# Patient Record
Sex: FEMALE | Race: WHITE | URBAN - METROPOLITAN AREA
[De-identification: names, ages, dates, MRNs, and addresses within clinical notes are randomized per-mention and may not be internally consistent; named-entity substitution may affect disease eponyms.]

---

## 2018-12-13 ENCOUNTER — HOSPITAL ENCOUNTER (OUTPATIENT)
Dept: ULTRASOUND IMAGING | Age: 28
Discharge: HOME | End: 2018-12-13
Payer: COMMERCIAL

## 2018-12-13 ENCOUNTER — HOSPITAL ENCOUNTER (OUTPATIENT)
Dept: WOMENS IMAGING | Age: 28
End: 2018-12-13
Payer: COMMERCIAL

## 2018-12-13 DIAGNOSIS — N63.0 BREAST LUMP: ICD-10-CM

## 2018-12-13 PROCEDURE — 76642 ULTRASOUND BREAST LIMITED: CPT

## 2020-08-14 ENCOUNTER — HOSPITAL ENCOUNTER (INPATIENT)
Age: 30
LOS: 3 days | Discharge: HOME | End: 2020-08-17
Attending: OBSTETRICS & GYNECOLOGY | Admitting: OBSTETRICS & GYNECOLOGY
Payer: COMMERCIAL

## 2020-08-14 PROBLEM — Z37.9 NORMAL LABOR: Status: ACTIVE | Noted: 2020-08-14

## 2020-08-14 LAB
ABO/RH: NORMAL
AMPHETAMINE SCREEN, URINE: NORMAL
ANTIBODY SCREEN: NORMAL
BARBITURATE SCREEN URINE: NORMAL
BENZODIAZEPINE SCREEN, URINE: NORMAL
BUPRENORPHINE URINE: NORMAL
CANNABINOID SCREEN URINE: NORMAL
COCAINE METABOLITE SCREEN URINE: NORMAL
HCT VFR BLD CALC: 35.4 % (ref 36–48)
HEMOGLOBIN: 12.6 G/DL (ref 12–16)
Lab: NORMAL
MCH RBC QN AUTO: 32.3 PG (ref 26–34)
MCHC RBC AUTO-ENTMCNC: 35.7 G/DL (ref 31–36)
MCV RBC AUTO: 90.4 FL (ref 80–100)
METHADONE SCREEN, URINE: NORMAL
OPIATE SCREEN URINE: NORMAL
OXYCODONE URINE: NORMAL
PDW BLD-RTO: 12.3 % (ref 12.4–15.4)
PH UA: 6
PHENCYCLIDINE SCREEN URINE: NORMAL
PLATELET # BLD: 123 K/UL (ref 135–450)
PMV BLD AUTO: 9.1 FL (ref 5–10.5)
PROPOXYPHENE SCREEN: NORMAL
RBC # BLD: 3.92 M/UL (ref 4–5.2)
WBC # BLD: 9.6 K/UL (ref 4–11)

## 2020-08-14 PROCEDURE — U0003 INFECTIOUS AGENT DETECTION BY NUCLEIC ACID (DNA OR RNA); SEVERE ACUTE RESPIRATORY SYNDROME CORONAVIRUS 2 (SARS-COV-2) (CORONAVIRUS DISEASE [COVID-19]), AMPLIFIED PROBE TECHNIQUE, MAKING USE OF HIGH THROUGHPUT TECHNOLOGIES AS DESCRIBED BY CMS-2020-01-R: HCPCS

## 2020-08-14 PROCEDURE — 80307 DRUG TEST PRSMV CHEM ANLYZR: CPT

## 2020-08-14 PROCEDURE — 85027 COMPLETE CBC AUTOMATED: CPT

## 2020-08-14 PROCEDURE — 86780 TREPONEMA PALLIDUM: CPT

## 2020-08-14 PROCEDURE — 86850 RBC ANTIBODY SCREEN: CPT

## 2020-08-14 PROCEDURE — 1220000000 HC SEMI PRIVATE OB R&B

## 2020-08-14 PROCEDURE — 2580000003 HC RX 258: Performed by: OBSTETRICS & GYNECOLOGY

## 2020-08-14 PROCEDURE — 86900 BLOOD TYPING SEROLOGIC ABO: CPT

## 2020-08-14 PROCEDURE — 86901 BLOOD TYPING SEROLOGIC RH(D): CPT

## 2020-08-14 RX ORDER — DOCUSATE SODIUM 100 MG/1
100 CAPSULE, LIQUID FILLED ORAL 2 TIMES DAILY
Status: DISCONTINUED | OUTPATIENT
Start: 2020-08-14 | End: 2020-08-15 | Stop reason: HOSPADM

## 2020-08-14 RX ORDER — SODIUM CHLORIDE, SODIUM LACTATE, POTASSIUM CHLORIDE, CALCIUM CHLORIDE 600; 310; 30; 20 MG/100ML; MG/100ML; MG/100ML; MG/100ML
INJECTION, SOLUTION INTRAVENOUS CONTINUOUS
Status: DISCONTINUED | OUTPATIENT
Start: 2020-08-14 | End: 2020-08-15

## 2020-08-14 RX ORDER — CETIRIZINE HYDROCHLORIDE 10 MG/1
10 TABLET ORAL DAILY
COMMUNITY

## 2020-08-14 RX ORDER — ONDANSETRON 2 MG/ML
4 INJECTION INTRAMUSCULAR; INTRAVENOUS EVERY 6 HOURS PRN
Status: DISCONTINUED | OUTPATIENT
Start: 2020-08-14 | End: 2020-08-15 | Stop reason: HOSPADM

## 2020-08-14 RX ORDER — SODIUM CHLORIDE 0.9 % (FLUSH) 0.9 %
10 SYRINGE (ML) INJECTION EVERY 12 HOURS SCHEDULED
Status: DISCONTINUED | OUTPATIENT
Start: 2020-08-14 | End: 2020-08-15 | Stop reason: HOSPADM

## 2020-08-14 RX ORDER — ACETAMINOPHEN 325 MG/1
650 TABLET ORAL EVERY 4 HOURS PRN
Status: DISCONTINUED | OUTPATIENT
Start: 2020-08-14 | End: 2020-08-15 | Stop reason: HOSPADM

## 2020-08-14 RX ORDER — SODIUM CHLORIDE 0.9 % (FLUSH) 0.9 %
10 SYRINGE (ML) INJECTION PRN
Status: DISCONTINUED | OUTPATIENT
Start: 2020-08-14 | End: 2020-08-15 | Stop reason: HOSPADM

## 2020-08-14 RX ADMIN — SODIUM CHLORIDE, POTASSIUM CHLORIDE, SODIUM LACTATE AND CALCIUM CHLORIDE: 600; 310; 30; 20 INJECTION, SOLUTION INTRAVENOUS at 21:20

## 2020-08-14 ASSESSMENT — PAIN DESCRIPTION - DESCRIPTORS: DESCRIPTORS: CRAMPING

## 2020-08-15 ENCOUNTER — ANESTHESIA (OUTPATIENT)
Dept: LABOR AND DELIVERY | Age: 30
End: 2020-08-15
Payer: COMMERCIAL

## 2020-08-15 ENCOUNTER — ANESTHESIA EVENT (OUTPATIENT)
Dept: LABOR AND DELIVERY | Age: 30
End: 2020-08-15
Payer: COMMERCIAL

## 2020-08-15 LAB
SARS-COV-2, NAA: NOT DETECTED
TOTAL SYPHILLIS IGG/IGM: NORMAL

## 2020-08-15 PROCEDURE — 2580000003 HC RX 258: Performed by: OBSTETRICS & GYNECOLOGY

## 2020-08-15 PROCEDURE — 7200000001 HC VAGINAL DELIVERY

## 2020-08-15 PROCEDURE — 1220000000 HC SEMI PRIVATE OB R&B

## 2020-08-15 PROCEDURE — 51701 INSERT BLADDER CATHETER: CPT

## 2020-08-15 PROCEDURE — 2500000003 HC RX 250 WO HCPCS

## 2020-08-15 PROCEDURE — 0KQM0ZZ REPAIR PERINEUM MUSCLE, OPEN APPROACH: ICD-10-PCS | Performed by: OBSTETRICS & GYNECOLOGY

## 2020-08-15 PROCEDURE — 6360000002 HC RX W HCPCS: Performed by: OBSTETRICS & GYNECOLOGY

## 2020-08-15 PROCEDURE — 3700000025 EPIDURAL BLOCK: Performed by: ANESTHESIOLOGY

## 2020-08-15 PROCEDURE — 59025 FETAL NON-STRESS TEST: CPT

## 2020-08-15 PROCEDURE — 2580000003 HC RX 258: Performed by: NURSE ANESTHETIST, CERTIFIED REGISTERED

## 2020-08-15 PROCEDURE — 2500000003 HC RX 250 WO HCPCS: Performed by: NURSE ANESTHETIST, CERTIFIED REGISTERED

## 2020-08-15 PROCEDURE — 3E033VJ INTRODUCTION OF OTHER HORMONE INTO PERIPHERAL VEIN, PERCUTANEOUS APPROACH: ICD-10-PCS | Performed by: OBSTETRICS & GYNECOLOGY

## 2020-08-15 PROCEDURE — 6370000000 HC RX 637 (ALT 250 FOR IP): Performed by: OBSTETRICS & GYNECOLOGY

## 2020-08-15 RX ORDER — IBUPROFEN 400 MG/1
800 TABLET ORAL EVERY 6 HOURS PRN
Status: DISCONTINUED | OUTPATIENT
Start: 2020-08-15 | End: 2020-08-17 | Stop reason: HOSPADM

## 2020-08-15 RX ORDER — SODIUM CHLORIDE 0.9 % (FLUSH) 0.9 %
10 SYRINGE (ML) INJECTION EVERY 12 HOURS SCHEDULED
Status: DISCONTINUED | OUTPATIENT
Start: 2020-08-15 | End: 2020-08-17 | Stop reason: HOSPADM

## 2020-08-15 RX ORDER — SODIUM CHLORIDE, SODIUM LACTATE, POTASSIUM CHLORIDE, CALCIUM CHLORIDE 600; 310; 30; 20 MG/100ML; MG/100ML; MG/100ML; MG/100ML
INJECTION, SOLUTION INTRAVENOUS CONTINUOUS PRN
Status: DISCONTINUED | OUTPATIENT
Start: 2020-08-15 | End: 2020-08-15 | Stop reason: SDUPTHER

## 2020-08-15 RX ORDER — FERROUS SULFATE TAB EC 324 MG (65 MG FE EQUIVALENT) 324 (65 FE) MG
324 TABLET DELAYED RESPONSE ORAL 2 TIMES DAILY WITH MEALS
Status: DISCONTINUED | OUTPATIENT
Start: 2020-08-15 | End: 2020-08-17 | Stop reason: HOSPADM

## 2020-08-15 RX ORDER — HYDROCODONE BITARTRATE AND ACETAMINOPHEN 5; 325 MG/1; MG/1
1 TABLET ORAL EVERY 4 HOURS PRN
Status: DISCONTINUED | OUTPATIENT
Start: 2020-08-15 | End: 2020-08-17 | Stop reason: HOSPADM

## 2020-08-15 RX ORDER — SODIUM CHLORIDE 0.9 % (FLUSH) 0.9 %
10 SYRINGE (ML) INJECTION PRN
Status: DISCONTINUED | OUTPATIENT
Start: 2020-08-15 | End: 2020-08-17 | Stop reason: HOSPADM

## 2020-08-15 RX ORDER — DOCUSATE SODIUM 100 MG/1
100 CAPSULE, LIQUID FILLED ORAL 2 TIMES DAILY
Status: DISCONTINUED | OUTPATIENT
Start: 2020-08-15 | End: 2020-08-17 | Stop reason: HOSPADM

## 2020-08-15 RX ORDER — LANOLIN 100 %
OINTMENT (GRAM) TOPICAL PRN
Status: DISCONTINUED | OUTPATIENT
Start: 2020-08-15 | End: 2020-08-17 | Stop reason: HOSPADM

## 2020-08-15 RX ORDER — ACETAMINOPHEN 325 MG/1
650 TABLET ORAL EVERY 4 HOURS PRN
Status: DISCONTINUED | OUTPATIENT
Start: 2020-08-15 | End: 2020-08-17 | Stop reason: HOSPADM

## 2020-08-15 RX ORDER — NALBUPHINE HCL 10 MG/ML
5 AMPUL (ML) INJECTION EVERY 4 HOURS PRN
Status: DISCONTINUED | OUTPATIENT
Start: 2020-08-15 | End: 2020-08-15 | Stop reason: HOSPADM

## 2020-08-15 RX ORDER — ONDANSETRON 2 MG/ML
4 INJECTION INTRAMUSCULAR; INTRAVENOUS EVERY 6 HOURS PRN
Status: DISCONTINUED | OUTPATIENT
Start: 2020-08-15 | End: 2020-08-15 | Stop reason: HOSPADM

## 2020-08-15 RX ORDER — DIPHENHYDRAMINE HYDROCHLORIDE 50 MG/ML
25 INJECTION INTRAMUSCULAR; INTRAVENOUS EVERY 6 HOURS PRN
Status: DISCONTINUED | OUTPATIENT
Start: 2020-08-15 | End: 2020-08-15 | Stop reason: HOSPADM

## 2020-08-15 RX ORDER — ONDANSETRON 4 MG/1
8 TABLET, ORALLY DISINTEGRATING ORAL EVERY 8 HOURS PRN
Status: DISCONTINUED | OUTPATIENT
Start: 2020-08-15 | End: 2020-08-17 | Stop reason: HOSPADM

## 2020-08-15 RX ORDER — SODIUM CHLORIDE, SODIUM LACTATE, POTASSIUM CHLORIDE, CALCIUM CHLORIDE 600; 310; 30; 20 MG/100ML; MG/100ML; MG/100ML; MG/100ML
INJECTION, SOLUTION INTRAVENOUS CONTINUOUS
Status: DISCONTINUED | OUTPATIENT
Start: 2020-08-15 | End: 2020-08-17 | Stop reason: HOSPADM

## 2020-08-15 RX ORDER — NALOXONE HYDROCHLORIDE 0.4 MG/ML
0.4 INJECTION, SOLUTION INTRAMUSCULAR; INTRAVENOUS; SUBCUTANEOUS PRN
Status: DISCONTINUED | OUTPATIENT
Start: 2020-08-15 | End: 2020-08-15 | Stop reason: HOSPADM

## 2020-08-15 RX ORDER — HYDROCODONE BITARTRATE AND ACETAMINOPHEN 5; 325 MG/1; MG/1
2 TABLET ORAL EVERY 4 HOURS PRN
Status: DISCONTINUED | OUTPATIENT
Start: 2020-08-15 | End: 2020-08-17 | Stop reason: HOSPADM

## 2020-08-15 RX ADMIN — Medication 10 ML/HR: at 03:22

## 2020-08-15 RX ADMIN — Medication 10 ML: at 03:21

## 2020-08-15 RX ADMIN — HYDROCODONE BITARTRATE AND ACETAMINOPHEN 1 TABLET: 5; 325 TABLET ORAL at 23:59

## 2020-08-15 RX ADMIN — Medication 4 MILLI-UNITS/MIN: at 02:45

## 2020-08-15 RX ADMIN — IBUPROFEN 800 MG: 400 TABLET, FILM COATED ORAL at 15:06

## 2020-08-15 RX ADMIN — IBUPROFEN 800 MG: 400 TABLET, FILM COATED ORAL at 20:48

## 2020-08-15 RX ADMIN — SODIUM CHLORIDE, SODIUM LACTATE, POTASSIUM CHLORIDE, AND CALCIUM CHLORIDE: .6; .31; .03; .02 INJECTION, SOLUTION INTRAVENOUS at 03:11

## 2020-08-15 RX ADMIN — Medication 3 MILLI-UNITS/MIN: at 02:15

## 2020-08-15 RX ADMIN — Medication 6 MILLI-UNITS/MIN: at 04:30

## 2020-08-15 RX ADMIN — Medication 1 MILLI-UNITS/MIN: at 01:15

## 2020-08-15 RX ADMIN — Medication 5 MILLI-UNITS/MIN: at 03:30

## 2020-08-15 RX ADMIN — Medication 2 MILLI-UNITS/MIN: at 01:45

## 2020-08-15 RX ADMIN — SODIUM CHLORIDE, POTASSIUM CHLORIDE, SODIUM LACTATE AND CALCIUM CHLORIDE: 600; 310; 30; 20 INJECTION, SOLUTION INTRAVENOUS at 03:21

## 2020-08-15 RX ADMIN — Medication 10 ML: at 20:38

## 2020-08-15 ASSESSMENT — PAIN DESCRIPTION - DESCRIPTORS
DESCRIPTORS: CRAMPING
DESCRIPTORS: DISCOMFORT
DESCRIPTORS: CRAMPING

## 2020-08-15 ASSESSMENT — PAIN DESCRIPTION - PROGRESSION: CLINICAL_PROGRESSION: GRADUALLY IMPROVING

## 2020-08-15 ASSESSMENT — PAIN DESCRIPTION - FREQUENCY: FREQUENCY: INTERMITTENT

## 2020-08-15 ASSESSMENT — PAIN - FUNCTIONAL ASSESSMENT: PAIN_FUNCTIONAL_ASSESSMENT: ACTIVITIES ARE NOT PREVENTED

## 2020-08-15 ASSESSMENT — PAIN DESCRIPTION - LOCATION: LOCATION: PERINEUM;VAGINA

## 2020-08-15 ASSESSMENT — PAIN SCALES - GENERAL
PAINLEVEL_OUTOF10: 1
PAINLEVEL_OUTOF10: 6
PAINLEVEL_OUTOF10: 5
PAINLEVEL_OUTOF10: 5

## 2020-08-15 ASSESSMENT — PAIN DESCRIPTION - RADICULAR PAIN: RADICULAR_PAIN: ABSENT

## 2020-08-15 ASSESSMENT — PAIN DESCRIPTION - ONSET: ONSET: GRADUAL

## 2020-08-15 ASSESSMENT — PAIN DESCRIPTION - PAIN TYPE: TYPE: ACUTE PAIN

## 2020-08-15 NOTE — ANESTHESIA POSTPROCEDURE EVALUATION
Department of Anesthesiology  Postprocedure Note    Patient: Sid Caballero  MRN: [id number]  YOB: 1990  Date of evaluation: 8/15/2020  Time:  11:41 AM     Procedure Summary     Date:  08/15/20 Room / Location:      Anesthesia Start:  5255 Anesthesia Stop:  1004    Procedure:  Labor Analgesia Diagnosis:      Scheduled Providers:   Responsible Provider:  Osman Richards MD    Anesthesia Type:  epidural ASA Status:  2          Anesthesia Type: No value filed. Rose Mary Phase I: Rose Mary Score: 9    Rose Mary Phase II:      Last vitals: Reviewed and per EMR flowsheets.        Anesthesia Post Evaluation    Patient location during evaluation: bedside  Patient participation: complete - patient participated  Level of consciousness: awake and alert  Pain score: 1  Airway patency: patent  Nausea & Vomiting: no nausea and no vomiting  Complications: no  Cardiovascular status: hemodynamically stable  Respiratory status: room air, spontaneous ventilation and acceptable  Hydration status: stable

## 2020-08-15 NOTE — L&D DELIVERY NOTE
Department of Obstetrics and Gynecology  Spontaneous Vaginal Delivery Note      Azael LESTER,[id number]    PRENATAL CARE: Complicated by: none    Pre-operative Diagnosis:  SROM      Post-operative Diagnosis: The same    Additional Procedures: perineal laceration repair second degree    Information for the patient's :  Mary Ma [[id number]]        7lb 11 oz  APGARS 7/9            Infant Wt:   Information for the patient's :  Mary Vital [[id number]]            APGARS:     Information for the patient's :  Mary Vital [[id number]]           Anesthesia:  epidural anesthesia    Specimen:  Placenta sent to pathology No    Estimated blood loss: 200 cc     Condition: infant stable and mother stable    Infant Feeding: breast    Delivery Summary: Patient is Forest Barrientos is a 30 y.o.  female at 38w4d admitted to Labor and Delivery room and placed on external monitors. Contractions were irregular, FHT was reactive with moderate variability without decels. Patient did received epidural anesthesia. She received pitocin  Labor progressed as anticipated to fully dilated cervix. With subsequent contractions she started pushing x 1 hour. Head delivered with tight nuchal, so patient push through and delivered vaginally spontaneously. 10 Units of Pitocin in 500 ml of LR was started IV. Placenta, cord and membranes were delivered spontaneously within less than 15 minutes. Uterus was not explored. The cervix, vagina and perineum were examined and second degree perineal laceration was repaired in the regular fashion with Vicryl. Uterus was well contracted. Vaginal sweep was done, laps count was correct. Mother and  left stable to recovery.      Electronically signed by Corey Rivas DO on 8/15/2020 at 10:30 AM

## 2020-08-15 NOTE — ANESTHESIA PROCEDURE NOTES
Epidural Block    Patient location during procedure: OB  Start time: 8/15/2020 3:11 AM  End time: 8/15/2020 3:25 AM  Reason for block: labor epidural  Preanesthetic Checklist  Completed: patient identified, site marked, surgical consent, pre-op evaluation, timeout performed, IV checked, risks and benefits discussed, monitors and equipment checked, anesthesia consent given, oxygen available and patient being monitored  Epidural  Patient position: sitting  Prep: Betadine and site prepped and draped  Patient monitoring: continuous pulse ox and frequent blood pressure checks  Approach: midline  Location: lumbar (1-5)  Injection technique: LAY saline  Procedures: paresthesia technique  Provider prep: mask and sterile gloves  Needle  Needle type: Tuohy   Needle gauge: 17 G  Needle length: 3.5 in  Needle insertion depth: 4 cm  Catheter type: side hole  Catheter size: 20G. Catheter at skin depth: 9 cm  Test dose: negative  Assessment  Sensory level: T6  Hemodynamics: stable  Attempts: 1  Additional Notes  Sterile tech. , sitting position, Lidocaine skin wheel, secured with steri-strips, tegaderm dressing, dosed & infusion with Marcaine 0.125% with Fentanyl 3 mcg/ml, infusion rate at 4 ml every 20 minutes.

## 2020-08-15 NOTE — ANESTHESIA PRE PROCEDURE
Department of Anesthesiology  Preprocedure Note       Name:  Shobha Osorio   Age:  30 y.o.  :  1990                                          MRN:  [id number]         Date:  8/15/2020      Surgeon: Dr. Kalpana Jacinto  Procedure: MARIETTA L&D    Medications prior to admission:   Prior to Admission medications    Medication Sig Start Date End Date Taking?  Authorizing Provider   cetirizine (ZYRTEC) 10 MG tablet Take 10 mg by mouth daily   Yes Historical Provider, MD       Current medications:    Current Facility-Administered Medications   Medication Dose Route Frequency Provider Last Rate Last Dose    lactated ringers infusion   Intravenous [facility],  mL/hr at 08/15/20 0321      sodium chloride flush 0.9 % injection 10 mL  10 mL Intravenous 2 times per day [facility], DO        sodium chloride flush 0.9 % injection 10 mL  10 mL Intravenous PRN [facility], DO        acetaminophen (TYLENOL) tablet 650 mg  650 mg Oral Q4H PRN [facility], DO        docusate sodium (COLACE) capsule 100 mg  100 mg Oral BID [facility], DO        ondansetron (ZOFRAN) injection 4 mg  4 mg Intravenous Q6H PRN [facility], DO        benzocaine-menthol (DERMOPLAST) 20-0.5 % spray   Topical PRN [facility], DO        oxytocin (PITOCIN) 30 units in 500 mL infusion  1 rehan-units/min Intravenous Continuous [facility], DO 5 mL/hr at 08/15/20 0330 5 rehan-units/min at 08/15/20 0330    oxytocin (PITOCIN) 30 units in 500 mL infusion  1 rehan-units/min Intravenous Continuous PRN [facility], DO         Facility-Administered Medications Ordered in Other Encounters   Medication Dose Route Frequency Provider Last Rate Last Dose    lactated ringers infusion    Continuous PRN Catherne Se, APRN - CRNA        fentaNYL 3 mcg/ml bupivacaine 0.125% in sodium chloride 0.9% 100 mL epidural    Continuous PRN Catherne Se, APRN - CRNA 10 mL/hr at 08/15/20 0322 10 mL/hr at 08/15/20 0322    fentaNYL 3 mcg/ml bupivacaine 0.125% in sodium chloride 0.9% 100 mL epidural    PRN Winnie Bamberger, APRN - CRNA   10 mL at 08/15/20 0321       Allergies: Allergies   Allergen Reactions    Peanut (Diagnostic)      08/06/2020 - ls 07/29/2020 -         Problem List:    Patient Active Problem List   Diagnosis Code    Normal labor O80, Z37.9       Past Medical History:        Diagnosis Date    Asthma        Past Surgical History:  History reviewed. No pertinent surgical history. Social History:    Social History     Tobacco Use    Smoking status: Never Smoker    Smokeless tobacco: Never Used   Substance Use Topics    Alcohol use: Not Currently                                Counseling given: Not Answered      Vital Signs (Current):   Vitals:    08/14/20 2355 08/15/20 0056 08/15/20 0146 08/15/20 0318   BP: 121/69 138/76 130/62 138/86   Pulse: 80 79 85 90   Resp: 16 16 16 18   Temp: 36.9 °C (98.4 °F) 36.5 °C (97.7 °F) 37 °C (98.6 °F) 36.7 °C (98 °F)   TempSrc: Oral Oral Oral Oral   Weight:       Height:                                                  BP Readings from Last 3 Encounters:   08/15/20 138/86       NPO Status:                                                                                 BMI:   Wt Readings from Last 3 Encounters:   08/14/20 152 lb (68.9 kg)     Body mass index is 26.93 kg/m². CBC:   Lab Results   Component Value Date    WBC 9.6 08/14/2020    RBC 3.92 08/14/2020    HGB 12.6 08/14/2020    HCT 35.4 08/14/2020    MCV 90.4 08/14/2020    RDW 12.3 08/14/2020     08/14/2020       CMP: No results found for: NA, K, CL, CO2, BUN, CREATININE, GFRAA, AGRATIO, LABGLOM, GLUCOSE, PROT, CALCIUM, BILITOT, ALKPHOS, AST, ALT    POC Tests: No results for input(s): POCGLU, POCNA, POCK, POCCL, POCBUN, POCHEMO, POCHCT in the last 72 hours.     Coags: No results found for: PROTIME, INR, APTT    HCG (If Applicable): No results found for: PREGTESTUR, PREGSERUM, HCG, HCGQUANT ABGs: No results found for: PHART, PO2ART, WJD0TVJ, SDW2CQB, BEART, Q7YEDMTB     Type & Screen (If Applicable):  No results found for: LABABO, LABRH    Drug/Infectious Status (If Applicable):  No results found for: HIV, HEPCAB    COVID-19 Screening (If Applicable): No results found for: COVID19      Anesthesia Evaluation  Patient summary reviewed and Nursing notes reviewed  Airway: Mallampati: II  TM distance: >3 FB   Neck ROM: full  Mouth opening: > = 3 FB Dental: normal exam         Pulmonary:Negative Pulmonary ROS and normal exam              Patient did not smoke on day of surgery. Cardiovascular:Negative CV ROS             Beta Blocker:  Not on Beta Blocker         Neuro/Psych:   Negative Neuro/Psych ROS              GI/Hepatic/Renal: Neg GI/Hepatic/Renal ROS            Endo/Other: Negative Endo/Other ROS             Pt had no PAT visit       Abdominal:           Vascular: negative vascular ROS. Anesthesia Plan      epidural     ASA 2             Anesthetic plan and risks discussed with spouse and patient. Use of blood products discussed with patient and spouse whom consented to blood products. Plan discussed with CRNA. OB History        1    Para        Term                AB        Living           SAB        TAB        Ectopic        Molar        Multiple        Live Births                    Forest Barrientos is a 30y.o. year-old female admitted to [facility], for MARIETTA. Gestational age is 38w3d. Her Body mass index is 26.93 kg/m². She was seen, examined and her chart was reviewed (including anesthesia, drug and allergy history). No interval changes are noted to her history and physical examination. (except as noted above).     Risks/benefits/alternatives of both neuraxial and general anesthesia were discussed and she agrees to proceed at the direction of the care team.    JAYCE Smith - AILYN Guaman

## 2020-08-15 NOTE — PLAN OF CARE
Problem: Pain:  Description: Pain management should include both nonpharmacologic and pharmacologic interventions. Goal: Pain level will decrease  Description: Pain level will decrease  Outcome: Ongoing     Problem: Pain:  Description: Pain management should include both nonpharmacologic and pharmacologic interventions.   Goal: Control of acute pain  Description: Control of acute pain  Outcome: Ongoing

## 2020-08-15 NOTE — H&P
Department of Obstetrics and Gynecology   Obstetrics History and Physical        CHIEF COMPLAINT:  Leaking water    HISTORY OF PRESENT ILLNESS:    Simin Baumann  is a 30 y.o.  female at 38w3d presents with a chief complaint as above and is being admitted for PROM at term. Estimated Due Date: Estimated Date of Delivery: 20    PRENATAL CARE: Complicated by: none    PAST OB HISTORY:  OB History        1    Para        Term                AB        Living           SAB        TAB        Ectopic        Molar        Multiple        Live Births                  Past Medical History:        Diagnosis Date    Asthma      Past Surgical History:    History reviewed. No pertinent surgical history.   Allergies:  Peanut (diagnostic)  Social History:    Social History     Socioeconomic History    Marital status:      Spouse name: Not on file    Number of children: Not on file    Years of education: Not on file    Highest education level: Not on file   Occupational History    Not on file   Social Needs    Financial resource strain: Not on file    Food insecurity     Worry: Not on file     Inability: Not on file    Transportation needs     Medical: Not on file     Non-medical: Not on file   Tobacco Use    Smoking status: Never Smoker    Smokeless tobacco: Never Used   Substance and Sexual Activity    Alcohol use: Not Currently    Drug use: Never    Sexual activity: Yes     Partners: Male   Lifestyle    Physical activity     Days per week: Not on file     Minutes per session: Not on file    Stress: Not on file   Relationships    Social connections     Talks on phone: Not on file     Gets together: Not on file     Attends Yazidi service: Not on file     Active member of club or organization: Not on file     Attends meetings of clubs or organizations: Not on file     Relationship status: Not on file    Intimate partner violence     Fear of current or ex partner: Not on file Emotionally abused: Not on file     Physically abused: Not on file     Forced sexual activity: Not on file   Other Topics Concern    Not on file   Social History Narrative    Not on file     Family History:   History reviewed. No pertinent family history. Medications Prior to Admission:  Medications Prior to Admission: cetirizine (ZYRTEC) 10 MG tablet, Take 10 mg by mouth daily    REVIEW OF SYSTEMS:  negative     PHYSICAL EXAM:    Vitals:    08/15/20 0634 08/15/20 0702 08/15/20 0734 08/15/20 0801   BP: 129/67 123/69 131/73 121/71   Pulse: 96 99 91 94   Resp: 16 16 16 16   Temp: 97.9 °F (36.6 °C)  97.9 °F (36.6 °C)    TempSrc: Oral  Oral    Weight:       Height:         General appearance:  awake, alert, cooperative, no apparent distress, and appears stated age  Neurologic:  Awake, alert, oriented to name, place and time. Lungs:  No increased work of breathing, good air exchange  Abdomen:  Soft, non tender, gravid, fundal height consistent with the gestational age, EFW by Leopold's maneuvers is 3300 grams  Pelvis:  Adequate pelvis  Cervix: 4/60/-3, SROMed per sse per MD   Contraction frequency: every 10 minutes  Membranes:  Ruptured clear fluid  Labs:   CBC:   Lab Results   Component Value Date    WBC 9.6 08/14/2020    RBC 3.92 08/14/2020    HGB 12.6 08/14/2020    HCT 35.4 08/14/2020    MCV 90.4 08/14/2020    MCH 32.3 08/14/2020    MCHC 35.7 08/14/2020    RDW 12.3 08/14/2020     08/14/2020    MPV 9.1 08/14/2020       ASSESSMENT:  PROM @ 38w3d @ 1500    PLAN: Admit  Labor: Routine labor orders  Fetus: Reassuring  GBS: Negative    Augmentation with pitocin after 2 hours if no change.      Electronically signed by Juana Murillo DO on 8/15/2020 at 10:28 AM

## 2020-08-15 NOTE — FLOWSHEET NOTE
Rec'd call from Dr. Kalpana Jacinto to check on patient's status. New orders to practice push with patient and let her know when to head in for delivery.

## 2020-08-15 NOTE — FLOWSHEET NOTE
Dr. Mariola Mahajan called with patients status of complete, 0 station 100% effacement. Instructed to sit patient up and let baby come down. Verbalized understanding.  Room set up for delivery

## 2020-08-15 NOTE — FLOWSHEET NOTE
Patient up to bathroom with assist x 2. Gait steady, no c/o dizziness or lightheadedness. Voided 175cc lucero urine without difficulty. Diann care demonstrated, pt verbalized understanding. Ambulated to w/c without difficulty. Patient transferred to postpartum room 2259 via w/c with infant in arms, accompanied by spouse. Settled into postpartum room. Pt oriented to folder and postpartum care. Oriented to call light, phone and ordering meals. Postpartum RN's name and phone number posted for pt. Siderails up x2. Pt oriented to equipment. All questions answered.

## 2020-08-15 NOTE — H&P
Department of Obstetrics and Gynecology  Triage Evaluation Note    SUBJECTIVE:  Vicky Hua is a 30 y.o.,  at 37 weeks who presents for possible SROM. She denies vaginal bleeding or contractions. She states the baby is moving well. She denies fever, shortness of breath, palpitations, nausea, vomiting, diarrhea on ROS. I personally reviewed the past medical and surgical histories, as well as the problem list.    OBJECTIVE:  Vital Signs: /73   Pulse 112   Temp 97.9 °F (36.6 °C) (Oral)   Resp 16   Appearance/Psychiatric: alert and oriented X3  Constitutional: Appears well, no distress  Cardiovascular: She does not have edema. Respiratory:Respiratory effort is normal.  Gastrointestinal: soft, non tender, Gravid. The uterine size is equal to dates. Pelvic: Cervix 4/80/-1 vertex --grosly ruptured with clear fluid seen on spec exam.   NST read:  Cat. 1 tracing, no decels, accels present  Chandler:  irreg contractions    LABS:  Beta-strep neg. ASSESSMENT AND PLAN:  30 y.o.  at 37 weeks with SROM. Plan admit for delivery.       Electronically signed by Franco Vizcarra MD on 2020 at 10:03 PM

## 2020-08-15 NOTE — FLOWSHEET NOTE
Dr. Shakila Montague called and informed of pt presentation to L&D with c/o SROM. Grossly ruptured and 4/80/-1 per Dr. Salinas Briones. Cat I FHT tracing. Orders received to admit to L&D and expectantly at this time. OK to start Pitocin at 0100 if no cervical change. May have epidural when requests.

## 2020-08-15 NOTE — FLOWSHEET NOTE
Dr. Erick Angel preparing for delivery. Call to Charge Nurse, Carmella Lyons to come in for delivery.

## 2020-08-16 PROCEDURE — 6370000000 HC RX 637 (ALT 250 FOR IP): Performed by: OBSTETRICS & GYNECOLOGY

## 2020-08-16 PROCEDURE — 1220000000 HC SEMI PRIVATE OB R&B

## 2020-08-16 RX ADMIN — DOCUSATE SODIUM 100 MG: 100 CAPSULE, LIQUID FILLED ORAL at 20:54

## 2020-08-16 RX ADMIN — IBUPROFEN 800 MG: 400 TABLET, FILM COATED ORAL at 20:54

## 2020-08-16 RX ADMIN — HYDROCODONE BITARTRATE AND ACETAMINOPHEN 1 TABLET: 5; 325 TABLET ORAL at 12:43

## 2020-08-16 RX ADMIN — DOCUSATE SODIUM 100 MG: 100 CAPSULE, LIQUID FILLED ORAL at 08:26

## 2020-08-16 RX ADMIN — IBUPROFEN 800 MG: 400 TABLET, FILM COATED ORAL at 06:02

## 2020-08-16 RX ADMIN — HYDROCODONE BITARTRATE AND ACETAMINOPHEN 1 TABLET: 5; 325 TABLET ORAL at 20:54

## 2020-08-16 RX ADMIN — IBUPROFEN 800 MG: 400 TABLET, FILM COATED ORAL at 12:43

## 2020-08-16 ASSESSMENT — PAIN - FUNCTIONAL ASSESSMENT
PAIN_FUNCTIONAL_ASSESSMENT: ACTIVITIES ARE NOT PREVENTED

## 2020-08-16 ASSESSMENT — PAIN DESCRIPTION - DESCRIPTORS
DESCRIPTORS: ACHING

## 2020-08-16 ASSESSMENT — PAIN DESCRIPTION - PAIN TYPE
TYPE: ACUTE PAIN

## 2020-08-16 ASSESSMENT — PAIN DESCRIPTION - FREQUENCY
FREQUENCY: INTERMITTENT

## 2020-08-16 ASSESSMENT — PAIN DESCRIPTION - LOCATION
LOCATION: PERINEUM

## 2020-08-16 ASSESSMENT — PAIN DESCRIPTION - RADICULAR PAIN: RADICULAR_PAIN: ABSENT

## 2020-08-16 ASSESSMENT — PAIN DESCRIPTION - PROGRESSION
CLINICAL_PROGRESSION: GRADUALLY WORSENING
CLINICAL_PROGRESSION: GRADUALLY WORSENING
CLINICAL_PROGRESSION: NOT CHANGED
CLINICAL_PROGRESSION: GRADUALLY IMPROVING
CLINICAL_PROGRESSION: NOT CHANGED
CLINICAL_PROGRESSION: NOT CHANGED

## 2020-08-16 ASSESSMENT — PAIN SCALES - GENERAL
PAINLEVEL_OUTOF10: 5
PAINLEVEL_OUTOF10: 2
PAINLEVEL_OUTOF10: 3
PAINLEVEL_OUTOF10: 4
PAINLEVEL_OUTOF10: 4
PAINLEVEL_OUTOF10: 2
PAINLEVEL_OUTOF10: 4
PAINLEVEL_OUTOF10: 4

## 2020-08-16 ASSESSMENT — PAIN DESCRIPTION - ONSET
ONSET: GRADUAL
ONSET: GRADUAL

## 2020-08-16 NOTE — PROGRESS NOTES
Department of Obstetrics and Gynecology  Vaginal Delivery Postpartum Rounds    SUBJECTIVE:  Pain is controlled with Tylenol or non-steroidal anti-inflammatory drugs. Her lochia is normal.    OBJECTIVE:  Vital Signs: /68   Pulse 81   Temp 97.5 °F (36.4 °C) (Oral)   Resp 18   Ht 5' 3\" (1.6 m)   Wt 152 lb (68.9 kg)   SpO2 97%   Breastfeeding Unknown   BMI 26.93 kg/m²   Appearance/Psychiatric: awake, alert, cooperative, no apparent distress, appears stated age  Constitutional: The patient is well nourished. Cardiovascular: She does not have edema. Respiratory: Respiratory effort is normal.  Gastrointestinal: Soft, non tender, uterine fundus is firm below umbilicus  Extremities: nontender to palpation  Perineum: intact     LABS / IMAGING:  CBC:   Lab Results   Component Value Date    WBC 9.6 2020    RBC 3.92 2020    HGB 12.6 2020    HCT 35.4 2020    MCV 90.4 2020    MCH 32.3 2020    MCHC 35.7 2020    RDW 12.3 2020     2020    MPV 9.1 2020       Lab Results   Component Value Date    WBC 9.6 2020    RBC 3.92 2020    HGB 12.6 2020    HCT 35.4 2020    MCV 90.4 2020    MCH 32.3 2020    MCHC 35.7 2020    RDW 12.3 2020     2020    MPV 9.1 2020     No results found for: NA, K, CL, CO2, BUN, CREATININE, GLUCOSE, CALCIUM  No results found for: POCGLU  No results found for: ALKPHOS, ALT, AST, PROT, BILITOT, BILIDIR, LABALBU  Lab Results   Component Value Date    PHUR 6.0 2020     No results found for: LABRPR    ASSESSMENT:    Postpartum Day 1 s/p     PLAN:   1. Continue routine postpartum care   2. Discharge home on Postpartum Day 1  3. Return to office in 6 weeks   4.  Postpartum instructions reviewed and all patient's Questions answered    circ in office    Electronically signed by Royer Wei DO on 2020 at 10:26 AM

## 2020-08-16 NOTE — PLAN OF CARE
Problem: Pain:  Goal: Pain level will decrease  Description: Pain level will decrease  8/16/2020 0106 by Partha Toure RN  Outcome: Ongoing     Problem: Pain:  Goal: Control of acute pain  Description: Control of acute pain  8/16/2020 0106 by Partha Toure RN  Outcome: Ongoing  Note: Pain well controlled with medication when needed     Problem: Pain:  Goal: Control of chronic pain  Description: Control of chronic pain  8/16/2020 0106 by Partha Toure RN  Outcome: Ongoing     Problem: Discharge Planning:  Goal: Discharged to appropriate level of care  Description: Discharged to appropriate level of care  Outcome: Ongoing     Problem: Constipation:  Goal: Bowel elimination is within specified parameters  Description: Bowel elimination is within specified parameters  Outcome: Ongoing     Problem: Fluid Volume - Imbalance:  Goal: Absence of imbalanced fluid volume signs and symptoms  Description: Absence of imbalanced fluid volume signs and symptoms  Outcome: Ongoing     Problem: Fluid Volume - Imbalance:  Goal: Absence of postpartum hemorrhage signs and symptoms  Description: Absence of postpartum hemorrhage signs and symptoms  Outcome: Ongoing     Problem: Infection - Risk of, Puerperal Infection:  Goal: Will show no infection signs and symptoms  Description: Will show no infection signs and symptoms  Outcome: Ongoing     Problem: Mood - Altered:  Goal: Mood stable  Description: Mood stable  Outcome: Ongoing

## 2020-08-16 NOTE — FLOWSHEET NOTE
Mom resting on couch. Offers no complaints of pain. Tolerating a regular diet, drinking fluids. Instructed her to call with any questions or concerns. Verbalized understanding.

## 2020-08-16 NOTE — ANESTHESIA POSTPROCEDURE EVALUATION
Department of Anesthesiology  Postprocedure Note    Patient: Jose Luis Leavitt  MRN: [id number]  YOB: 1990  Date of evaluation: 8/16/2020  Time:  12:56 PM     Procedure Summary     Date:  08/15/20 Room / Location:      Anesthesia Start:  0311 Anesthesia Stop:  1004    Procedure:  Labor Analgesia Diagnosis:      Scheduled Providers:   Responsible Provider:  Svetlana Rangel MD    Anesthesia Type:  epidural ASA Status:  2          Anesthesia Type: No value filed. Rose Mary Phase I: Rose Mary Score: 9    Rose Mary Phase II: Rose Mary Score: 10    Last vitals: Reviewed and per EMR flowsheets.        Anesthesia Post Evaluation    Patient location during evaluation: floor  Patient participation: complete - patient participated  Level of consciousness: awake and alert  Pain score: 2  Airway patency: patent  Nausea & Vomiting: no vomiting and no nausea  Complications: no  Cardiovascular status: hemodynamically stable  Respiratory status: room air, spontaneous ventilation and acceptable  Hydration status: stable

## 2020-08-16 NOTE — DISCHARGE SUMMARY
Department of Obstetrics and Gynecology  Postpartum Discharge Summary      Admit Date: 2020    Admit Diagnosis: Normal labor [O80, Z37.9]    Discharge Date: 20    Discharge Diagnoses: spontaneous vaginal delivery, 2nd degree lac     David Myers   Home Medication Instructions [facility]:[id number]    Printed on:20 1029   Medication Information                      cetirizine (ZYRTEC) 10 MG tablet  Take 10 mg by mouth daily                 Service: Obstetrics    Consults: none    Significant Diagnostic Studies: none    Postpartum complications: none     Condition at Discharge: good    Hospital Course: uncomplicated    Discharge Instructions: Activity: as tolerated    Diet: regular diet    Instructions: No intercourse and nothing in the vagina for 6 weeks. Do not drive while using pain medications.  Keep any wounds clean and dry    Discharge to: Home    Disposition / Follow up: Return to office in 6 weeks    Home Health Nurse visit within 24-48 h if qualifies    [place] Data:  Weight   Information for the patient's :  Madeline Lili [[id number]]        [address] for the patient's :  Federico [place]auer [place] [id number] with mother    Electronically signed by Royer Wei DO on 2020 at 10:29 AM

## 2020-08-17 VITALS
BODY MASS INDEX: 26.93 KG/M2 | SYSTOLIC BLOOD PRESSURE: 106 MMHG | HEIGHT: 63 IN | WEIGHT: 152 LBS | DIASTOLIC BLOOD PRESSURE: 66 MMHG | OXYGEN SATURATION: 98 % | TEMPERATURE: 98.3 F | HEART RATE: 71 BPM | RESPIRATION RATE: 16 BRPM

## 2020-08-17 PROCEDURE — 6370000000 HC RX 637 (ALT 250 FOR IP): Performed by: OBSTETRICS & GYNECOLOGY

## 2020-08-17 RX ADMIN — DOCUSATE SODIUM 100 MG: 100 CAPSULE, LIQUID FILLED ORAL at 08:24

## 2020-08-17 RX ADMIN — IBUPROFEN 800 MG: 400 TABLET, FILM COATED ORAL at 08:24

## 2020-08-17 ASSESSMENT — PAIN SCALES - GENERAL: PAINLEVEL_OUTOF10: 4

## 2020-08-17 NOTE — FLOWSHEET NOTE
Assessment completed. Bleeding is mild, voiding without difficulty. Ice to perineum for some relief as well as pain med given as charted. Resting in bed. Drinking fluids.

## 2020-08-17 NOTE — PLAN OF CARE
Problem: Fluid Volume - Imbalance:  Goal: Absence of imbalanced fluid volume signs and symptoms  Description: Absence of imbalanced fluid volume signs and symptoms  8/17/2020 0351 by Wiley García RN  Outcome: Met This Shift  Note: Drinking fluids. Voiding without difficulty     Problem: Fluid Volume - Imbalance:  Goal: Absence of postpartum hemorrhage signs and symptoms  Description: Absence of postpartum hemorrhage signs and symptoms  8/17/2020 0351 by Wiley García RN  Outcome: Met This Shift  Note: VSS. Bleeding is mild with no clots or odor     Problem: Infection - Risk of, Puerperal Infection:  Goal: Will show no infection signs and symptoms  Description: Will show no infection signs and symptoms  8/17/2020 0351 by Wiley García RN  Outcome: Met This Shift     Problem: Mood - Altered:  Goal: Mood stable  Description: Mood stable  8/17/2020 0351 by Wiley García RN  Outcome: Met This Shift  Note: Is pleasant and cooperative. Is independent in her care and in caring for infant. Breast feeding well and is independent in latching infant at breast.     Problem: Pain:  Goal: Pain level will decrease  Description: Pain level will decrease  8/17/2020 0351 by Wiley García RN  Outcome: Ongoing  Note: Does have some discomfort from cramping and from perineal repair. Pain meds given as ordered and relief is given from them     Problem: Discharge Planning:  Goal: Discharged to appropriate level of care  Description: Discharged to appropriate level of care  8/17/2020 0351 by Wiley García RN  Outcome: Ongoing  Note: Anticipate discharge to home today     Problem: Constipation:  Goal: Bowel elimination is within specified parameters  Description: Bowel elimination is within specified parameters  8/17/2020 0351 by Wiley García RN  Outcome: Ongoing  Note: Abdomen is soft and tender. Bowel sounds active. Colace given as ordered. Passing gas.   Tolerating a regular diet

## 2020-08-17 NOTE — FLOWSHEET NOTE
VSS.  Awake and alert w/ cares. Up and about in room taking care of self and infant in room. No concerns at this time.

## 2022-06-07 LAB
ABO, EXTERNAL RESULT: NORMAL
C. TRACHOMATIS, EXTERNAL RESULT: NORMAL
HEP B, EXTERNAL RESULT: NORMAL
HEPATITIS C ANTIBODY, EXTERNAL RESULT: NORMAL
HIV, EXTERNAL RESULT: NON REACTIVE
N. GONORRHOEAE, EXTERNAL RESULT: NORMAL
RH FACTOR, EXTERNAL RESULT: NORMAL
RPR, EXTERNAL RESULT: NON REACTIVE
RUBELLA TITER, EXTERNAL RESULT: NORMAL

## 2022-12-28 LAB — GBS, EXTERNAL RESULT: POSITIVE

## 2023-01-09 ENCOUNTER — PREP FOR PROCEDURE (OUTPATIENT)
Dept: OBGYN | Age: 33
End: 2023-01-09

## 2023-01-09 RX ORDER — SODIUM CHLORIDE 9 MG/ML
25 INJECTION, SOLUTION INTRAVENOUS PRN
Status: CANCELLED | OUTPATIENT
Start: 2023-01-09

## 2023-01-09 RX ORDER — SODIUM CHLORIDE 0.9 % (FLUSH) 0.9 %
5-40 SYRINGE (ML) INJECTION PRN
Status: CANCELLED | OUTPATIENT
Start: 2023-01-09

## 2023-01-09 RX ORDER — METHYLERGONOVINE MALEATE 0.2 MG/ML
200 INJECTION INTRAVENOUS PRN
Status: CANCELLED | OUTPATIENT
Start: 2023-01-09

## 2023-01-09 RX ORDER — SODIUM CHLORIDE, SODIUM LACTATE, POTASSIUM CHLORIDE, AND CALCIUM CHLORIDE .6; .31; .03; .02 G/100ML; G/100ML; G/100ML; G/100ML
1000 INJECTION, SOLUTION INTRAVENOUS PRN
Status: CANCELLED | OUTPATIENT
Start: 2023-01-09

## 2023-01-09 RX ORDER — SODIUM CHLORIDE, SODIUM LACTATE, POTASSIUM CHLORIDE, CALCIUM CHLORIDE 600; 310; 30; 20 MG/100ML; MG/100ML; MG/100ML; MG/100ML
INJECTION, SOLUTION INTRAVENOUS CONTINUOUS
Status: CANCELLED | OUTPATIENT
Start: 2023-01-09

## 2023-01-09 RX ORDER — SODIUM CHLORIDE 0.9 % (FLUSH) 0.9 %
5-40 SYRINGE (ML) INJECTION EVERY 12 HOURS SCHEDULED
Status: CANCELLED | OUTPATIENT
Start: 2023-01-09

## 2023-01-09 RX ORDER — MISOPROSTOL 100 UG/1
800 TABLET ORAL PRN
Status: CANCELLED | OUTPATIENT
Start: 2023-01-09

## 2023-01-09 RX ORDER — SODIUM CHLORIDE, SODIUM LACTATE, POTASSIUM CHLORIDE, AND CALCIUM CHLORIDE .6; .31; .03; .02 G/100ML; G/100ML; G/100ML; G/100ML
500 INJECTION, SOLUTION INTRAVENOUS PRN
Status: CANCELLED | OUTPATIENT
Start: 2023-01-09

## 2023-01-09 RX ORDER — ONDANSETRON 2 MG/ML
4 INJECTION INTRAMUSCULAR; INTRAVENOUS EVERY 6 HOURS PRN
Status: CANCELLED | OUTPATIENT
Start: 2023-01-09

## 2023-01-09 RX ORDER — CARBOPROST TROMETHAMINE 250 UG/ML
250 INJECTION, SOLUTION INTRAMUSCULAR PRN
Status: CANCELLED | OUTPATIENT
Start: 2023-01-09

## 2023-01-19 ENCOUNTER — ANESTHESIA EVENT (OUTPATIENT)
Dept: LABOR AND DELIVERY | Age: 33
End: 2023-01-19
Payer: COMMERCIAL

## 2023-01-19 ENCOUNTER — APPOINTMENT (OUTPATIENT)
Dept: LABOR AND DELIVERY | Age: 33
End: 2023-01-19
Payer: COMMERCIAL

## 2023-01-19 ENCOUNTER — ANESTHESIA (OUTPATIENT)
Dept: LABOR AND DELIVERY | Age: 33
End: 2023-01-19
Payer: COMMERCIAL

## 2023-01-19 ENCOUNTER — HOSPITAL ENCOUNTER (INPATIENT)
Age: 33
LOS: 1 days | Discharge: HOME | End: 2023-01-20
Attending: OBSTETRICS & GYNECOLOGY | Admitting: OBSTETRICS & GYNECOLOGY
Payer: COMMERCIAL

## 2023-01-19 PROBLEM — Z98.890 STATUS POST INDUCTION OF LABOR: Status: ACTIVE | Noted: 2023-01-19

## 2023-01-19 LAB
ABO/RH: NORMAL
AMPHETAMINE SCREEN, URINE: NORMAL
ANTIBODY SCREEN: NORMAL
BARBITURATE SCREEN URINE: NORMAL
BENZODIAZEPINE SCREEN, URINE: NORMAL
BUPRENORPHINE URINE: NORMAL
CANNABINOID SCREEN URINE: NORMAL
COCAINE METABOLITE SCREEN URINE: NORMAL
FENTANYL SCREEN, URINE: NORMAL
HCT VFR BLD CALC: 38.3 % (ref 36–48)
HEMOGLOBIN: 13.1 G/DL (ref 12–16)
Lab: NORMAL
MCH RBC QN AUTO: 31.3 PG (ref 26–34)
MCHC RBC AUTO-ENTMCNC: 34.3 G/DL (ref 31–36)
MCV RBC AUTO: 91.3 FL (ref 80–100)
METHADONE SCREEN, URINE: NORMAL
OPIATE SCREEN URINE: NORMAL
OXYCODONE URINE: NORMAL
PDW BLD-RTO: 13 % (ref 12.4–15.4)
PH UA: 7
PHENCYCLIDINE SCREEN URINE: NORMAL
PLATELET # BLD: 102 K/UL (ref 135–450)
PMV BLD AUTO: 8.8 FL (ref 5–10.5)
RBC # BLD: 4.19 M/UL (ref 4–5.2)
WBC # BLD: 9.1 K/UL (ref 4–11)

## 2023-01-19 PROCEDURE — 2580000003 HC RX 258: Performed by: NURSE ANESTHETIST, CERTIFIED REGISTERED

## 2023-01-19 PROCEDURE — 85027 COMPLETE CBC AUTOMATED: CPT

## 2023-01-19 PROCEDURE — 7200000001 HC VAGINAL DELIVERY

## 2023-01-19 PROCEDURE — 86780 TREPONEMA PALLIDUM: CPT

## 2023-01-19 PROCEDURE — 86850 RBC ANTIBODY SCREEN: CPT

## 2023-01-19 PROCEDURE — 0KQM0ZZ REPAIR PERINEUM MUSCLE, OPEN APPROACH: ICD-10-PCS | Performed by: OBSTETRICS & GYNECOLOGY

## 2023-01-19 PROCEDURE — 51701 INSERT BLADDER CATHETER: CPT

## 2023-01-19 PROCEDURE — 1220000000 HC SEMI PRIVATE OB R&B

## 2023-01-19 PROCEDURE — 6360000002 HC RX W HCPCS: Performed by: OBSTETRICS & GYNECOLOGY

## 2023-01-19 PROCEDURE — 2500000003 HC RX 250 WO HCPCS: Performed by: NURSE ANESTHETIST, CERTIFIED REGISTERED

## 2023-01-19 PROCEDURE — 2580000003 HC RX 258: Performed by: OBSTETRICS & GYNECOLOGY

## 2023-01-19 PROCEDURE — 86900 BLOOD TYPING SEROLOGIC ABO: CPT

## 2023-01-19 PROCEDURE — 59025 FETAL NON-STRESS TEST: CPT

## 2023-01-19 PROCEDURE — 10907ZC DRAINAGE OF AMNIOTIC FLUID, THERAPEUTIC FROM PRODUCTS OF CONCEPTION, VIA NATURAL OR ARTIFICIAL OPENING: ICD-10-PCS | Performed by: OBSTETRICS & GYNECOLOGY

## 2023-01-19 PROCEDURE — 6370000000 HC RX 637 (ALT 250 FOR IP): Performed by: OBSTETRICS & GYNECOLOGY

## 2023-01-19 PROCEDURE — 2500000003 HC RX 250 WO HCPCS

## 2023-01-19 PROCEDURE — 3700000025 EPIDURAL BLOCK: Performed by: ANESTHESIOLOGY

## 2023-01-19 PROCEDURE — 3E033VJ INTRODUCTION OF OTHER HORMONE INTO PERIPHERAL VEIN, PERCUTANEOUS APPROACH: ICD-10-PCS | Performed by: OBSTETRICS & GYNECOLOGY

## 2023-01-19 PROCEDURE — 86901 BLOOD TYPING SEROLOGIC RH(D): CPT

## 2023-01-19 PROCEDURE — 80307 DRUG TEST PRSMV CHEM ANLYZR: CPT

## 2023-01-19 RX ORDER — ACETAMINOPHEN 500 MG
1000 TABLET ORAL EVERY 8 HOURS PRN
Status: DISCONTINUED | OUTPATIENT
Start: 2023-01-19 | End: 2023-01-20 | Stop reason: HOSPADM

## 2023-01-19 RX ORDER — CARBOPROST TROMETHAMINE 250 UG/ML
250 INJECTION, SOLUTION INTRAMUSCULAR PRN
Status: DISCONTINUED | OUTPATIENT
Start: 2023-01-19 | End: 2023-01-20 | Stop reason: HOSPADM

## 2023-01-19 RX ORDER — SODIUM CHLORIDE 0.9 % (FLUSH) 0.9 %
5-40 SYRINGE (ML) INJECTION PRN
Status: DISCONTINUED | OUTPATIENT
Start: 2023-01-19 | End: 2023-01-20 | Stop reason: HOSPADM

## 2023-01-19 RX ORDER — METHYLERGONOVINE MALEATE 0.2 MG/ML
200 INJECTION INTRAVENOUS PRN
Status: DISCONTINUED | OUTPATIENT
Start: 2023-01-19 | End: 2023-01-20 | Stop reason: HOSPADM

## 2023-01-19 RX ORDER — SODIUM CHLORIDE 9 MG/ML
25 INJECTION, SOLUTION INTRAVENOUS PRN
Status: DISCONTINUED | OUTPATIENT
Start: 2023-01-19 | End: 2023-01-19

## 2023-01-19 RX ORDER — PHYTONADIONE 1 MG/.5ML
1 INJECTION, EMULSION INTRAMUSCULAR; INTRAVENOUS; SUBCUTANEOUS ONCE
Status: DISCONTINUED | OUTPATIENT
Start: 2023-01-19 | End: 2023-01-19 | Stop reason: CLARIF

## 2023-01-19 RX ORDER — ERYTHROMYCIN 5 MG/G
OINTMENT OPHTHALMIC ONCE
Status: DISCONTINUED | OUTPATIENT
Start: 2023-01-19 | End: 2023-01-19 | Stop reason: CLARIF

## 2023-01-19 RX ORDER — SODIUM CHLORIDE 0.9 % (FLUSH) 0.9 %
5-40 SYRINGE (ML) INJECTION EVERY 12 HOURS SCHEDULED
Status: DISCONTINUED | OUTPATIENT
Start: 2023-01-19 | End: 2023-01-20 | Stop reason: HOSPADM

## 2023-01-19 RX ORDER — OXYCODONE HYDROCHLORIDE 5 MG/1
5 TABLET ORAL EVERY 4 HOURS PRN
Status: DISCONTINUED | OUTPATIENT
Start: 2023-01-19 | End: 2023-01-20 | Stop reason: HOSPADM

## 2023-01-19 RX ORDER — SODIUM CHLORIDE, SODIUM LACTATE, POTASSIUM CHLORIDE, AND CALCIUM CHLORIDE .6; .31; .03; .02 G/100ML; G/100ML; G/100ML; G/100ML
1000 INJECTION, SOLUTION INTRAVENOUS PRN
Status: DISCONTINUED | OUTPATIENT
Start: 2023-01-19 | End: 2023-01-20 | Stop reason: HOSPADM

## 2023-01-19 RX ORDER — NALOXONE HYDROCHLORIDE 0.4 MG/ML
INJECTION, SOLUTION INTRAMUSCULAR; INTRAVENOUS; SUBCUTANEOUS PRN
Status: DISCONTINUED | OUTPATIENT
Start: 2023-01-19 | End: 2023-01-19 | Stop reason: ALTCHOICE

## 2023-01-19 RX ORDER — SODIUM CHLORIDE, SODIUM LACTATE, POTASSIUM CHLORIDE, CALCIUM CHLORIDE 600; 310; 30; 20 MG/100ML; MG/100ML; MG/100ML; MG/100ML
INJECTION, SOLUTION INTRAVENOUS CONTINUOUS PRN
Status: DISCONTINUED | OUTPATIENT
Start: 2023-01-19 | End: 2023-01-19 | Stop reason: SDUPTHER

## 2023-01-19 RX ORDER — ONDANSETRON 2 MG/ML
4 INJECTION INTRAMUSCULAR; INTRAVENOUS EVERY 6 HOURS PRN
Status: DISCONTINUED | OUTPATIENT
Start: 2023-01-19 | End: 2023-01-19 | Stop reason: ALTCHOICE

## 2023-01-19 RX ORDER — NALBUPHINE HCL 10 MG/ML
5 AMPUL (ML) INJECTION EVERY 4 HOURS PRN
Status: DISCONTINUED | OUTPATIENT
Start: 2023-01-19 | End: 2023-01-19 | Stop reason: ALTCHOICE

## 2023-01-19 RX ORDER — SODIUM CHLORIDE, SODIUM LACTATE, POTASSIUM CHLORIDE, CALCIUM CHLORIDE 600; 310; 30; 20 MG/100ML; MG/100ML; MG/100ML; MG/100ML
INJECTION, SOLUTION INTRAVENOUS CONTINUOUS
Status: DISCONTINUED | OUTPATIENT
Start: 2023-01-19 | End: 2023-01-20 | Stop reason: HOSPADM

## 2023-01-19 RX ORDER — SODIUM CHLORIDE 9 MG/ML
INJECTION, SOLUTION INTRAVENOUS PRN
Status: DISCONTINUED | OUTPATIENT
Start: 2023-01-19 | End: 2023-01-20 | Stop reason: HOSPADM

## 2023-01-19 RX ORDER — SODIUM CHLORIDE, SODIUM LACTATE, POTASSIUM CHLORIDE, CALCIUM CHLORIDE 600; 310; 30; 20 MG/100ML; MG/100ML; MG/100ML; MG/100ML
INJECTION, SOLUTION INTRAVENOUS CONTINUOUS
Status: DISCONTINUED | OUTPATIENT
Start: 2023-01-19 | End: 2023-01-19 | Stop reason: SDUPTHER

## 2023-01-19 RX ORDER — SODIUM CHLORIDE, SODIUM LACTATE, POTASSIUM CHLORIDE, AND CALCIUM CHLORIDE .6; .31; .03; .02 G/100ML; G/100ML; G/100ML; G/100ML
500 INJECTION, SOLUTION INTRAVENOUS PRN
Status: DISCONTINUED | OUTPATIENT
Start: 2023-01-19 | End: 2023-01-20 | Stop reason: HOSPADM

## 2023-01-19 RX ORDER — MISOPROSTOL 200 UG/1
800 TABLET ORAL PRN
Status: DISCONTINUED | OUTPATIENT
Start: 2023-01-19 | End: 2023-01-20 | Stop reason: HOSPADM

## 2023-01-19 RX ORDER — IBUPROFEN 600 MG/1
600 TABLET ORAL EVERY 8 HOURS PRN
Status: DISCONTINUED | OUTPATIENT
Start: 2023-01-19 | End: 2023-01-20 | Stop reason: HOSPADM

## 2023-01-19 RX ORDER — DOCUSATE SODIUM 100 MG/1
100 CAPSULE, LIQUID FILLED ORAL 2 TIMES DAILY
Status: DISCONTINUED | OUTPATIENT
Start: 2023-01-19 | End: 2023-01-20 | Stop reason: HOSPADM

## 2023-01-19 RX ORDER — LANOLIN 100 %
OINTMENT (GRAM) TOPICAL PRN
Status: DISCONTINUED | OUTPATIENT
Start: 2023-01-19 | End: 2023-01-20 | Stop reason: HOSPADM

## 2023-01-19 RX ORDER — ONDANSETRON 2 MG/ML
4 INJECTION INTRAMUSCULAR; INTRAVENOUS EVERY 6 HOURS PRN
Status: DISCONTINUED | OUTPATIENT
Start: 2023-01-19 | End: 2023-01-20 | Stop reason: HOSPADM

## 2023-01-19 RX ADMIN — Medication 87.3 MILLI-UNITS/MIN: at 17:16

## 2023-01-19 RX ADMIN — Medication 2.5 MILLION UNITS: at 10:26

## 2023-01-19 RX ADMIN — IBUPROFEN 600 MG: 600 TABLET, FILM COATED ORAL at 20:06

## 2023-01-19 RX ADMIN — Medication 2.5 MILLION UNITS: at 14:29

## 2023-01-19 RX ADMIN — SODIUM CHLORIDE, SODIUM LACTATE, POTASSIUM CHLORIDE, AND CALCIUM CHLORIDE: .6; .31; .03; .02 INJECTION, SOLUTION INTRAVENOUS at 11:04

## 2023-01-19 RX ADMIN — SODIUM CHLORIDE, POTASSIUM CHLORIDE, SODIUM LACTATE AND CALCIUM CHLORIDE: 600; 310; 30; 20 INJECTION, SOLUTION INTRAVENOUS at 11:17

## 2023-01-19 RX ADMIN — Medication 10 ML: at 11:14

## 2023-01-19 RX ADMIN — Medication 166.7 ML: at 17:25

## 2023-01-19 RX ADMIN — Medication 1 MILLI-UNITS/MIN: at 06:44

## 2023-01-19 RX ADMIN — DEXTROSE MONOHYDRATE 5 MILLION UNITS: 5 INJECTION INTRAVENOUS at 06:38

## 2023-01-19 RX ADMIN — SODIUM CHLORIDE, POTASSIUM CHLORIDE, SODIUM LACTATE AND CALCIUM CHLORIDE: 600; 310; 30; 20 INJECTION, SOLUTION INTRAVENOUS at 14:31

## 2023-01-19 RX ADMIN — SODIUM CHLORIDE, POTASSIUM CHLORIDE, SODIUM LACTATE AND CALCIUM CHLORIDE: 600; 310; 30; 20 INJECTION, SOLUTION INTRAVENOUS at 06:35

## 2023-01-19 RX ADMIN — DOCUSATE SODIUM 100 MG: 100 CAPSULE, LIQUID FILLED ORAL at 21:29

## 2023-01-19 ASSESSMENT — PAIN DESCRIPTION - DESCRIPTORS
DESCRIPTORS: ACHING
DESCRIPTORS: ACHING

## 2023-01-19 ASSESSMENT — PAIN DESCRIPTION - ORIENTATION: ORIENTATION: DISTAL

## 2023-01-19 ASSESSMENT — PAIN SCALES - GENERAL: PAINLEVEL_OUTOF10: 3

## 2023-01-19 ASSESSMENT — PAIN DESCRIPTION - LOCATION: LOCATION: BACK;NECK

## 2023-01-19 ASSESSMENT — PAIN - FUNCTIONAL ASSESSMENT: PAIN_FUNCTIONAL_ASSESSMENT: ACTIVITIES ARE NOT PREVENTED

## 2023-01-19 NOTE — FLOWSHEET NOTE
Pt c/o increased rectal pressure. SVE AC/90/0. Straight cathed for 75mL. Repositioned to left side with peanut ball. Pt comfortable. Dr. Vinita Denny aware.

## 2023-01-19 NOTE — ANESTHESIA POSTPROCEDURE EVALUATION
Department of Anesthesiology  Postprocedure Note    Patient: Elton Chu  MRN: [id number]  [place]: 1990  Date of evaluation: 1/19/2023      Procedure Summary     Date: 01/19/23 Room / Location:     Anesthesia Start: 1104 Anesthesia Stop: 1735    Procedure: Labor Analgesia Diagnosis:     Scheduled Providers:  Responsible Provider: Ludy Greer MD    Anesthesia Type: epidural ASA Status: 2          Anesthesia Type: No value filed.     Rose Mary Phase I: Rose Mary Score: 10    Rose Mary Phase II:        Anesthesia Post Evaluation    Patient location during evaluation: floor  Patient participation: complete - patient participated  Level of consciousness: awake and alert  Pain score: 2  Airway patency: patent  Nausea & Vomiting: no vomiting and no nausea  Complications: no  Cardiovascular status: hemodynamically stable  Respiratory status: room air, spontaneous ventilation and acceptable  Hydration status: stable

## 2023-01-19 NOTE — FLOWSHEET NOTE
Pt to OB labor room for scheduled pitocin induction of labor. Pt and spouse orient to room, call light in reach, plan of care discussed. Pt placed on monitor, viewed at central bank. Admission and assessment completed. IV placed, labs drawn and LR infusing. Consents reviewed and signed.

## 2023-01-19 NOTE — H&P
Department of Obstetrics and Gynecology   Obstetrics History and Physical        CHIEF COMPLAINT:  induction    HISTORY OF PRESENT ILLNESS:    Ruth Eisenmenger  is a 27 y.o.  female at 38w11d presents with a chief complaint as above and is being admitted for induction    Estimated Due Date: Estimated Date of Delivery: 23    PRENATAL CARE: Complicated by: none    PAST OB HISTORY:  OB History          4    Para   1    Term   1            AB   2    Living   1         SAB   2    IAB        Ectopic        Molar        Multiple   0    Live Births   1              Past Medical History:        Diagnosis Date    Asthma      Past Surgical History:    History reviewed. No pertinent surgical history. Allergies:  Peanut (diagnostic)  Social History:    Social History     Socioeconomic History    Marital status:      Spouse name: Not on file    Number of children: Not on file    Years of education: Not on file    Highest education level: Not on file   Occupational History    Not on file   Tobacco Use    Smoking status: Never    Smokeless tobacco: Never   Vaping Use    Vaping Use: Never used   Substance and Sexual Activity    Alcohol use: Not Currently    Drug use: Never    Sexual activity: Yes     Partners: Male   Other Topics Concern    Not on file   Social History Narrative    Not on file     Social Determinants of Health     Financial Resource Strain: Not on file   Food Insecurity: Not on file   Transportation Needs: Not on file   Physical Activity: Not on file   Stress: Not on file   Social Connections: Not on file   Intimate Partner Violence: Not on file   Housing Stability: Not on file     Family History:   History reviewed. No pertinent family history.   Medications Prior to Admission:  Medications Prior to Admission: Prenatal MV-Min-Fe Fum-FA-DHA (PRENATAL 1 PO), Take by mouth  cetirizine (ZYRTEC) 10 MG tablet, Take 10 mg by mouth daily    REVIEW OF SYSTEMS:  negative     PHYSICAL EXAM:    Vitals:    01/19/23 1635 01/19/23 1709 01/19/23 1735 01/19/23 1750   BP:   118/70 101/65   Pulse:   89 83   Resp:   16 16   Temp: 97.8 °F (36.6 °C)  98.1 °F (36.7 °C)    TempSrc: Oral  Oral    SpO2:  100%     Weight:       Height:         General appearance:  awake, alert, cooperative, no apparent distress, and appears stated age  Neurologic:  Awake, alert, oriented to name, place and time. Lungs:  No increased work of breathing, good air exchange  Abdomen:  Soft, non tender, gravid, fundal height consistent with the gestational age, EFW by Leopold's maneuvers is 7 lbs. ,  8 oz. Pelvis:  Adequate pelvis  Cervix: 2 cm  Contraction frequency: none  Membranes:  Intact  Labs: CBC:   Lab Results   Component Value Date/Time    WBC 9.1 01/19/2023 06:25 AM    RBC 4.19 01/19/2023 06:25 AM    HGB 13.1 01/19/2023 06:25 AM    HCT 38.3 01/19/2023 06:25 AM    MCV 91.3 01/19/2023 06:25 AM    MCH 31.3 01/19/2023 06:25 AM    MCHC 34.3 01/19/2023 06:25 AM    RDW 13.0 01/19/2023 06:25 AM     01/19/2023 06:25 AM    MPV 8.8 01/19/2023 06:25 AM       ASSESSMENT:  Status post induction of labor    PLAN: pitocin, Admit  Labor: Routine labor orders  Fetus: Reassuring  GBS: Positive    I have presented reasonable alternatives to the patient's proposed care, treatment, and services. The discussion I have done encompassed risks, benefits, and side effects related to the alternatives and the risks related to not receiving the proposed care, treatment, and services. All questions answered. Patient wishes to proceed. The surgical site was confirmed by the patient and me.       Electronically signed by Gina Drake MD on 1/19/2023 at 6:22 PM

## 2023-01-19 NOTE — ANESTHESIA PRE PROCEDURE
Department of Anesthesiology  Preprocedure Note       Name:  Cynthia Adjutant   Age:  27 y.o.  :  1990                                          MRN:  [id number]         Date:  2023      Surgeon: Dr. Ladi Ambriz  Procedure: Leonides Wandaer for L&D  Medications prior to admission:   Prior to Admission medications    Medication Sig Start Date End Date Taking?  Authorizing Provider   Prenatal MV-Min-Fe Fum-FA-DHA (PRENATAL 1 PO) Take by mouth   Yes Historical Provider, MD   cetirizine (ZYRTEC) 10 MG tablet Take 10 mg by mouth daily    Historical Provider, MD       Current medications:    Current Facility-Administered Medications   Medication Dose Route Frequency Provider Last Rate Last Admin    lactated ringers infusion   IntraVENous Continuous Marlene George  mL/hr at 23 0802 Rate Verify at 23 0802    lactated ringers bolus  500 mL IntraVENous PRN Marlene George MD        Or    lactated ringers bolus  1,000 mL IntraVENous PRN Marlene George MD        sodium chloride flush 0.9 % injection 5-40 mL  5-40 mL IntraVENous 2 times per day Marlene George MD        sodium chloride flush 0.9 % injection 5-40 mL  5-40 mL IntraVENous PRN Marlene George MD        0.9 % sodium chloride infusion  25 mL IntraVENous PRN Marlene George MD        oxytocin (PITOCIN) 30 units in 500 mL infusion  1-20 rehan-units/min IntraVENous Continuous Marlene George MD 4 mL/hr at 23 0802 4 rehan-units/min at 23 0802    methylergonovine (METHERGINE) injection 200 mcg  200 mcg IntraMUSCular PRN Marlene George MD        carboprost [facility]) injection 250 mcg  250 mcg IntraMUSCular PRN Marlene George MD        miSOPROStol (CYTOTEC) tablet 800 mcg  800 mcg Rectal PRN Marlene George MD        tranexamic acid (CYCLOKAPRON) 1000 mg in sodium chloride 0.9 % 50 mL IVPB  1,000 mg IntraVENous Once PRN MD Ene Parsons oxytocin (PITOCIN) 30 units in 500 mL infusion  87.3 rehan-units/min IntraVENous Continuous PRN Rodrigo Lea MD        And    oxytocin (PITOCIN) 10 unit bolus from the bag  10 Units IntraVENous PRN Rodrigo Lea MD        ondansetron [facility]) injection 4 mg  4 mg IntraVENous Q6H PRN Rodrigo Lea MD        penicillin G potassium in d5w IVPB 2.5 Million Units  2.5 Million Units IntraVENous Q4H Rodrigo Lea MD           Allergies: Allergies   Allergen Reactions    Peanut (Diagnostic)      08/06/2020 - ls 07/29/2020 -         Problem List:    Patient Active Problem List   Diagnosis Code    Normal labor O80, Z37.9    Status post induction of labor Z98.890       Past Medical History:        Diagnosis Date    Asthma        Past Surgical History:  History reviewed. No pertinent surgical history. Social History:    Social History     Tobacco Use    Smoking status: Never    Smokeless tobacco: Never   Substance Use Topics    Alcohol use: Not Currently                                Counseling given: Not Answered      Vital Signs (Current):   Vitals:    01/19/23 0644 01/19/23 0720 01/19/23 0754 01/19/23 0758   BP: 134/74 133/77 131/79 131/79   Pulse: 92 89  93   Resp:       Temp:       TempSrc:       SpO2:       Weight:       Height:                                                  BP Readings from Last 3 Encounters:   01/19/23 131/79   08/17/20 106/66       NPO Status:                                                                                 BMI:   Wt Readings from Last 3 Encounters:   01/19/23 155 lb (70.3 kg)   08/14/20 152 lb (68.9 kg)     Body mass index is 27.46 kg/m².     CBC:   Lab Results   Component Value Date/Time    WBC 9.1 01/19/2023 06:25 AM    RBC 4.19 01/19/2023 06:25 AM    HGB 13.1 01/19/2023 06:25 AM    HCT 38.3 01/19/2023 06:25 AM    MCV 91.3 01/19/2023 06:25 AM    RDW 13.0 01/19/2023 06:25 AM     01/19/2023 06:25 AM       CMP: No results found for: NA, K, CL, CO2, BUN, CREATININE, GFRAA, AGRATIO, LABGLOM, GLUCOSE, GLU, PROT, CALCIUM, BILITOT, ALKPHOS, AST, ALT    POC Tests: No results for input(s): POCGLU, POCNA, POCK, POCCL, POCBUN, POCHEMO, POCHCT in the last 72 hours. Coags: No results found for: PROTIME, INR, APTT    HCG (If Applicable): No results found for: PREGTESTUR, PREGSERUM, HCG, HCGQUANT     ABGs: No results found for: PHART, PO2ART, VLH6HNR, TQN4BWL, BEART, P9PMZAPR     Type & Screen (If Applicable):  No results found for: LABABO, LABRH    Drug/Infectious Status (If Applicable):  No results found for: HIV, HEPCAB    COVID-19 Screening (If Applicable):   Lab Results   Component Value Date/Time    COVID19 NOT DETECTED 2020 10:30 PM           Anesthesia Evaluation  Patient summary reviewed and Nursing notes reviewed  Airway: Mallampati: II  TM distance: >3 FB   Neck ROM: full  Mouth opening: > = 3 FB   Dental: normal exam   (+) caps      Pulmonary:Negative Pulmonary ROS and normal exam              Patient did not smoke on day of surgery. Cardiovascular:Negative CV ROS             Beta Blocker:  Not on Beta Blocker         Neuro/Psych:   Negative Neuro/Psych ROS              GI/Hepatic/Renal: Neg GI/Hepatic/Renal ROS            Endo/Other: Negative Endo/Other ROS             Pt had no PAT visit       Abdominal:             Vascular: negative vascular ROS. Other Findings:           Anesthesia Plan      epidural     ASA 2             Anesthetic plan and risks discussed with spouse and patient. Use of blood products discussed with patient and spouse whom consented to blood products. Plan discussed with CRNA.                 OB History        4    Para   1    Term   1            AB   2    Living   1       SAB   2    IAB        Ectopic        Molar        Multiple   0    Live Births   Isadora Omalley is a 27y.o. year-old female admitted to Arti Wright MD for MARIETTA Gestational age is 38w11d. Her Body mass index is 27.46 kg/m². She was seen, examined and her chart was reviewed (including anesthesia, drug and allergy history). No interval changes are noted to her history and physical examination. (except as noted above).     Risks/benefits/alternatives of both neuraxial and general anesthesia were discussed and she agrees to proceed at the direction of the care team.    JAYCE Thibodeaux CRNA  January 19, 2023  8:13 AM    JAYCE Thibodeaux CRNA   1/19/2023

## 2023-01-19 NOTE — ANESTHESIA PROCEDURE NOTES
Epidural Block    Patient location during procedure: OB  Start time: 1/19/2023 11:04 AM  End time: 1/19/2023 11:30 AM  Reason for block: labor epidural  Epidural  Patient position: sitting  Prep: Betadine and site prepped and draped  Patient monitoring: continuous pulse ox and frequent blood pressure checks  Approach: midline  Location: L3-4  Injection technique: LAY saline  Guidance: paresthesia technique  Provider prep: mask and sterile gloves  Needle  Needle type: Tuohy   Needle gauge: 17 G  Needle length: 3.5 in  Needle insertion depth: 5 cm  Catheter type: side hole  Catheter size: 20 G  Catheter at skin depth: 10 cm  Test dose: negativeCatheter Secured: tegaderm  Assessment  Sensory level: T6  Hemodynamics: stable  Attempts: 1  Outcomes: patient tolerated procedure well  Additional Notes  Sterile tech. , sitting position, Lidocaine skin wheel, secured with steri-strips, tegaderm dressing, dosed & infusion with Marcaine 0.125% with Fentanyl 3 mcg/ml, infusion rate at 4 ml every 20 minutes.    Preanesthetic Checklist  Completed: patient identified, IV checked, site marked, risks and benefits discussed, surgical/procedural consents, equipment checked, pre-op evaluation, timeout performed, anesthesia consent given, oxygen available, monitors applied/VS acknowledged, fire risk safety assessment completed and verbalized and blood product R/B/A discussed and consented

## 2023-01-19 NOTE — L&D DELIVERY SUMMARY NOTE
Department of Obstetrics and Gynecology  Spontaneous Vaginal Delivery Note      Pre-operative Diagnosis:  Term pregnancy and Induced labor    Post-operative Diagnosis:  Living  infant(s) and Male    Information for the patient's :  Elida Romo [[id number]]                  Infant Wt:   Information for the patient's :  Elida Romo [[id number]]         APGARS:     Information for the patient's :  Elida Romo [[id number]]         Anesthesia:  epidural anesthesia    Application and Delivery:  26 yo  at 39 5/7 weeks, IOL at term. Pitocin, AROM. Progressed well to complete. OP- manual rotation to OA. Pushed 30 minutes to  over 2nd degree lac. Viable male, skin to skin, delayed cord clamping. Placenta spont intact, uterus explored. Repair with 2-0,3-0 vicryl.  ml. Delivery Summary:  Labor & Delivery Summary  Dilation Complete Date: 23  Dilation Complete Time: 1635       Intake/Output:     Date 23 - 23 0700(Not Admitted) 23 - 23 0700   Shift 8820-0544 2769-5358 24 Hour Total 0987-7569 5809-1512 24 Hour Total   INTAKE   I.V.    1764.8  1764.8   Shift Total    1764.8  1764.8   OUTPUT   Urine    875  875   Blood    200  200     Quantitative Blood Loss (mL)    200  200   Shift Total    1075  1075   NET    689.8  689.8       Condition:  infant stable to general nursery and mother stable    Blood Type and Rh: O POS        Rubella Immunity Status:   Immune           Infant Feeding:    breast    Attending Attestation: I performed the procedure.

## 2023-01-19 NOTE — FLOWSHEET NOTE
Patient desires epidural.  Anesthesia aware and at bedside. Patient agreeable for procedure, verbal consent obtained and timeout performed.

## 2023-01-19 NOTE — FLOWSHEET NOTE
Patient actively pushing. RN and Dr. Pedro Rosenthal remains in continuous attendance at the bedside. Assessment & evaluation of fetal heart rate ongoing via continuous EFM.

## 2023-01-19 NOTE — FLOWSHEET NOTE
RN and Dr. River Marie remained at bedside throughout pushing. EFM continuously assessed. Vaginal delivery of viable male infant.

## 2023-01-20 VITALS
BODY MASS INDEX: 27.46 KG/M2 | RESPIRATION RATE: 18 BRPM | TEMPERATURE: 97.4 F | DIASTOLIC BLOOD PRESSURE: 65 MMHG | HEIGHT: 63 IN | HEART RATE: 81 BPM | OXYGEN SATURATION: 98 % | WEIGHT: 155 LBS | SYSTOLIC BLOOD PRESSURE: 100 MMHG

## 2023-01-20 LAB — TOTAL SYPHILLIS IGG/IGM: NORMAL

## 2023-01-20 PROCEDURE — 6370000000 HC RX 637 (ALT 250 FOR IP): Performed by: OBSTETRICS & GYNECOLOGY

## 2023-01-20 RX ADMIN — DOCUSATE SODIUM 100 MG: 100 CAPSULE, LIQUID FILLED ORAL at 09:01

## 2023-01-20 RX ADMIN — IBUPROFEN 600 MG: 600 TABLET, FILM COATED ORAL at 17:07

## 2023-01-20 RX ADMIN — OXYCODONE HYDROCHLORIDE 5 MG: 5 TABLET ORAL at 00:21

## 2023-01-20 RX ADMIN — ACETAMINOPHEN 1000 MG: 500 TABLET ORAL at 00:22

## 2023-01-20 RX ADMIN — OXYCODONE HYDROCHLORIDE 5 MG: 5 TABLET ORAL at 09:00

## 2023-01-20 RX ADMIN — OXYCODONE HYDROCHLORIDE 5 MG: 5 TABLET ORAL at 13:17

## 2023-01-20 RX ADMIN — ACETAMINOPHEN 1000 MG: 500 TABLET ORAL at 17:07

## 2023-01-20 RX ADMIN — ACETAMINOPHEN 1000 MG: 500 TABLET ORAL at 09:00

## 2023-01-20 RX ADMIN — IBUPROFEN 600 MG: 600 TABLET, FILM COATED ORAL at 08:01

## 2023-01-20 ASSESSMENT — PAIN DESCRIPTION - LOCATION
LOCATION: BACK
LOCATION: PERINEUM;ABDOMEN
LOCATION: PERINEUM
LOCATION: ABDOMEN

## 2023-01-20 ASSESSMENT — PAIN SCALES - GENERAL
PAINLEVEL_OUTOF10: 5
PAINLEVEL_OUTOF10: 4

## 2023-01-20 ASSESSMENT — PAIN DESCRIPTION - DESCRIPTORS
DESCRIPTORS: ACHING;CRAMPING
DESCRIPTORS: ACHING;CRAMPING
DESCRIPTORS: ACHING
DESCRIPTORS: ACHING;SORE
DESCRIPTORS: CRAMPING

## 2023-01-20 ASSESSMENT — PAIN - FUNCTIONAL ASSESSMENT
PAIN_FUNCTIONAL_ASSESSMENT: ACTIVITIES ARE NOT PREVENTED

## 2023-01-20 ASSESSMENT — PAIN DESCRIPTION - ORIENTATION
ORIENTATION: LOWER
ORIENTATION: LOWER

## 2023-01-20 NOTE — DISCHARGE SUMMARY
Department of Obstetrics and Gynecology  Postpartum Discharge Summary      Admit Date: 2023    Admit Diagnosis: IOL @ 44 5/7 w     Discharge Date:  2023 Any delay in discharge from ordered D/C date due to  factors. Discharge Diagnoses: spontaneous vaginal delivery       Medication List        CONTINUE taking these medications      PRENATAL 1 PO            STOP taking these medications      cetirizine 10 MG tablet  Commonly known as: ZYRTEC              Service: Obstetrics    Consults: None    Significant Diagnostic Studies: none    Postpartum complications: none     Condition at Discharge: [facility] Course: uncomplicated    Discharge Instructions: Activity: as tolerated    Diet: regular diet    Instructions: No intercourse and nothing in the vagina for 6 weeks. Do not drive while using pain medications.  Keep any wounds clean and dry    Discharge to: Home    Disposition / Follow up: Return to office in 6 weeks    Home Health Nurse visit within 24-48 h if qualifies     Data:  Apgars:  Information for the patient's :  Claribel Parson [[id number]]   APGAR One: 9   Information for the patient's :  Claribel Parson [[id number]]   APGAR Five: 9   Birth Weight:  Information for the patient's :  Adolfocar Parson [[id number]]   Birth Weight: 8 lb 3.6 oz (3.73 kg)   Home with mother    Electronically signed by Treva Lucas MD on 2023 at 11:03 AM

## 2023-01-20 NOTE — PLAN OF CARE
Problem: Pain  Goal: Verbalizes/displays adequate comfort level or baseline comfort level  1/20/2023 1833 by Evan Cuello RN  Outcome: Completed  1/20/2023 0927 by Evan Cuello RN  Outcome: Progressing  1/20/2023 0926 by Evan Cuello RN  Outcome: Progressing  1/20/2023 0644 by Maritza Moctezuma RN  Outcome: Progressing     Problem: Infection - Adult  Goal: Absence of infection at discharge  1/20/2023 1833 by Evan Cuello RN  Outcome: Completed  1/20/2023 0927 by Evan Cuello RN  Outcome: Progressing  1/20/2023 0926 by Evan Cuello RN  Outcome: Progressing  1/20/2023 0644 by Maritza Moctezuma RN  Outcome: Progressing  Goal: Absence of infection during hospitalization  1/20/2023 1833 by Evan Cuello RN  Outcome: Completed  1/20/2023 0927 by Evan Cuello RN  Outcome: Progressing  1/20/2023 0926 by Evan Cuello RN  Outcome: Progressing  1/20/2023 0644 by Maritza Moctezuma RN  Outcome: Progressing     Problem: Safety - Adult  Goal: Free from fall injury  1/20/2023 1833 by Evan Cuello RN  Outcome: Completed  1/20/2023 0927 by Evan Cuello RN  Outcome: Progressing  1/20/2023 0926 by Evan Cuello RN  Outcome: Progressing  1/20/2023 0644 by Maritza Moctezuma RN  Outcome: Progressing

## 2023-01-20 NOTE — PLAN OF CARE
Problem: Pain  Goal: Verbalizes/displays adequate comfort level or baseline comfort level  Outcome: Progressing     Problem: Infection - Adult  Goal: Absence of infection at discharge  Outcome: Progressing  Goal: Absence of infection during hospitalization  Outcome: Progressing     Problem: Safety - Adult  Goal: Free from fall injury  Outcome: Progressing

## 2023-01-20 NOTE — PLAN OF CARE
Problem: Pain  Goal: Verbalizes/displays adequate comfort level or baseline comfort level  1/20/2023 0927 by Fidencio Plummer RN  Outcome: Progressing  1/20/2023 0926 by Fidencio Plummer RN  Outcome: Progressing  1/20/2023 0644 by Richard Moctezuma RN  Outcome: Progressing  1/19/2023 1935 by Clara Ellis RN  Outcome: Progressing     Problem: Infection - Adult  Goal: Absence of infection at discharge  1/20/2023 0927 by Fidencio Plummer RN  Outcome: Progressing  1/20/2023 0926 by Fidencio Plummer RN  Outcome: Progressing  1/20/2023 0644 by Richard Moctezuma RN  Outcome: Progressing  1/19/2023 1935 by Clara Ellis RN  Outcome: Progressing  Goal: Absence of infection during hospitalization  1/20/2023 0927 by Fidencio Plummer RN  Outcome: Progressing  1/20/2023 0926 by Fidencio Plummer RN  Outcome: Progressing  1/20/2023 0644 by Richard Moctezuma RN  Outcome: Progressing  1/19/2023 1935 by Clara Ellis RN  Outcome: Progressing     Problem: Safety - Adult  Goal: Free from fall injury  1/20/2023 0927 by Fidencio Plummer RN  Outcome: Progressing  1/20/2023 0926 by Fidencio Plummer RN  Outcome: Progressing  1/20/2023 0644 by Richard Moctezuma RN  Outcome: Progressing  1/19/2023 1935 by Clara Ellis RN  Outcome: Progressing

## 2023-01-20 NOTE — PLAN OF CARE
Problem: Vaginal Birth or  Section  Goal: Fetal and maternal status remain reassuring during the birth process  Description:  Birth OB-Pregnancy care plan goal which identifies if the fetal and maternal status remain reassuring during the birth process  2023 by Melina Lund RN  Outcome: Completed     Problem: Pain  Goal: Verbalizes/displays adequate comfort level or baseline comfort level  2023 by Robbie Moctezuma RN  Outcome: Progressing  2023 by Melina Lund RN  Outcome: Progressing     Problem: Infection - Adult  Goal: Absence of infection at discharge  2023 by Robbie Moctezuma RN  Outcome: Progressing  2023 by Melina Lund RN  Outcome: Progressing     Problem: Infection - Adult  Goal: Absence of infection during hospitalization  2023 by Robbie Moctezuma RN  Outcome: Progressing  2023 by Melina Lund RN  Outcome: Progressing     Problem: Safety - Adult  Goal: Free from fall injury  2023 by Robbie Moctezuma RN  Outcome: Progressing  2023 by Melina Lund RN  Outcome: Progressing

## 2023-01-20 NOTE — PLAN OF CARE
Problem: Pain  Goal: Verbalizes/displays adequate comfort level or baseline comfort level  1/20/2023 0926 by Anita Plascencia RN  Outcome: Progressing  1/20/2023 0644 by Tobin Moctezuma RN  Outcome: Progressing  1/19/2023 1935 by Lacie Bolton RN  Outcome: Progressing     Problem: Infection - Adult  Goal: Absence of infection at discharge  1/20/2023 0926 by Anita Plascencia RN  Outcome: Progressing  1/20/2023 0644 by Tobin Moctezuma RN  Outcome: Progressing  1/19/2023 1935 by Lacie Bolton RN  Outcome: Progressing  Goal: Absence of infection during hospitalization  1/20/2023 0926 by Anita Plascencia RN  Outcome: Progressing  1/20/2023 0644 by Tobin Moctezuma RN  Outcome: Progressing  1/19/2023 1935 by Lacie Bolton RN  Outcome: Progressing     Problem: Safety - Adult  Goal: Free from fall injury  1/20/2023 0926 by Anita Plascencia RN  Outcome: Progressing  1/20/2023 0644 by Tobin Moctezuma RN  Outcome: Progressing  1/19/2023 1935 by Lacie Bolton RN  Outcome: Progressing

## 2023-01-20 NOTE — ANESTHESIA POSTPROCEDURE EVALUATION
Department of Anesthesiology  Postprocedure Note    Patient: Elias Andres  MRN: [id number]  [place]: 1990  Date of evaluation: 1/20/2023      Procedure Summary     Date: 01/19/23 Room / Location:     Anesthesia Start: 1104 Anesthesia Stop: 1735    Procedure: Labor Analgesia Diagnosis:     Scheduled Providers:  Responsible Provider: Irina Hooper MD    Anesthesia Type: epidural ASA Status: 2          Anesthesia Type: No value filed.     Rose Mary Phase I: Rose Mary Score: 10    Rose Mary Phase II: Rose Mary Score: 10      Anesthesia Post Evaluation    Patient location during evaluation: floor  Patient participation: complete - patient participated  Level of consciousness: awake and alert  Pain score: 0  Airway patency: patent  Nausea & Vomiting: no vomiting and no nausea  Complications: no  Cardiovascular status: blood pressure returned to baseline and hemodynamically stable  Respiratory status: acceptable and room air  Hydration status: stable

## 2023-01-20 NOTE — PROGRESS NOTES
Department of Obstetrics and Gynecology  Vaginal Delivery Postpartum Rounds    SUBJECTIVE:  Pain is controlled with non-steroidal anti-inflammatory drugs. Her lochia is normal.    OBJECTIVE:  Vital Signs: /84   Pulse 79   Temp 97.9 °F (36.6 °C) (Oral)   Resp 16   Ht 5' 3\" (1.6 m)   Wt 155 lb (70.3 kg)   SpO2 100%   Breastfeeding Unknown   BMI 27.46 kg/m²   Appearance/Psychiatric: awake, alert, cooperative, no apparent distress, appears stated age  Constitutional: The patient is well nourished. Cardiovascular: She does not have edema. Respiratory: Respiratory effort is normal.  Gastrointestinal: Soft, non tender, uterine fundus is firm below umbilicus  Extremities: nontender to palpation  Perineum: intact     LABS / IMAGING:      Lab Results   Component Value Date/Time    WBC 9.1 2023 06:25 AM    RBC 4.19 2023 06:25 AM    HGB 13.1 2023 06:25 AM    HCT 38.3 2023 06:25 AM    MCV 91.3 2023 06:25 AM    MCH 31.3 2023 06:25 AM    MCHC 34.3 2023 06:25 AM    RDW 13.0 2023 06:25 AM     2023 06:25 AM    MPV 8.8 2023 06:25 AM     No results found for: NA, K, CL, CO2, BUN, CREATININE, GLUCOSE, CALCIUM  No results found for: POCGLU  No results found for: ALKPHOS, ALT, AST, PROT, BILITOT, BILIDIR, LABALBU  Lab Results   Component Value Date/Time    PHUR 7.0 2023 06:25 AM     No results found for: LABRPR    ASSESSMENT:    Postpartum Day 1 s/p     PLAN:   1. Continue routine postpartum care   2. Discharge home on Postpartum Day 1  3. Return to office in 6 weeks   4.  Postpartum instructions reviewed and all patient's Questions answered    Electronically signed by Sony Solano MD on 2023 at 9:07 AM

## 2023-01-20 NOTE — FLOWSHEET NOTE
Delivery room set up, JOSE Paula RN aware pt is complete and will be starting to push, Dr. Rachele Álvarez at bedside.

## 2023-01-20 NOTE — DISCHARGE INSTRUCTIONS
Thank you for the opportunity to care for you and your family. We hope we always exceeded your expectations and provided very good care during your stay in the [facility]. We want to ensure that you have the help you need when you leave the hospital. If there is anything we can assist you with please let us know. Please call and schedule an appointment to be seen by your obstetrician as scheduled. You will need to call and make your own appointment. For breastfeeding moms, you can contact our lactation consultants with any problems or questions. Please call [phone] for questions. Diet  Eat a well balanced diet focusing on foods high in fiber and protein. Drink plenty of fluids, especially water. To avoid constipation you may take a mild stool softener as recommended by your doctor or midwife. Activity  Gradually increase your activity. Resume exercise only after advise by your doctor or midwife. Avoid lifting anything heavier than your baby for 2 weeks. Avoid driving for 5-7 days or when not taking narcotics. Rise slowly from a lying to sitting and then a standing position. Climb stairs one at a time. Use caution when carrying your baby up and down the stairs. NO SEXUAL activity for 6 weeks or until advised by your doctor. Nothing in the vagina. No tampons, no douches and no intercourse. Be prepared to discuss family planning at your follow-up OB visit. You may feel tired or have a lack of energy. You may continue your prenatal vitamin to replenish nutrients post delivery. Nap when your baby naps to catch up on sleep. EMOTIONS  You may feel orta, sad, teary and/or overwhelmed. Contact your OB provider if you feel you may be showing signs of postpartum depression or have thoughts of harming yourself or your baby. If infant will not stop crying, contact another adult for help. Place the infant in his/her crib and step away for a break.   NEVER shake your baby.      BLEEDING  Vaginal bleeding will decrease in amount over the next few weeks. You will notice that as your activity increases, you flow may increase. This is your body's way of telling you to \"take it easy\" and rest.  Call your OB if you are saturating more than one maxi pad in an hour for 2 hours and resting does not help. Also call if your bleeding has a foul odor or you are passing clots larger than a lemon on several occasions. BREAST CARE  Take pain medications as needed and as prescribed by your [facility] provider for pain. If you develop a warm, red, tender area on your breast or develop a fever contact your OB provider. For breastfeeding mothers: If you become engorged, feeding may be more difficult or painful for 1-2 days. You may find it helpful to hand express some milk. Hand expressing may make it easier for your baby to latch. While breastfeeding, continue to take your prenatal vitamins as directed by your doctor or midwife. Refer to the breastfeeding booklet in the  folder/binder for more information. For any questions or concerns please contact a Lactation Consultant. Leave a message and your call will be returned. For NON-breastfeeding mothers: You may apply ice packs to your breasts over your bra for twenty minutes at a time for comfort. Avoid stimulation to your breast.  When showering allow the water to strike your back not your breasts. Wear a good fitting bra until your milk has come and gone. A sports bra is a good idea. INCISIONAL CARE/ EPISIOTOMY CARE  Incisional care:  Clean your incision in the shower with mild soap and water. After showering pat the incision area dry and allow the area to remain open to air. If used, steri-strips should be removed by 2 weeks. If used, staples should be removed by the home health nurse or OB office by 1 week. An abdominal binder may provide support for your incision. Episiotomy (jeffery-care):   Use the jeffery-bottle after toileting, until your bleeding stops. Cleanse your perineum from front to back. If used, stitches will dissolve in 4-6 weeks. You may use a sitz bath or soak in a clean tub with antibacterial soap as needed for comfort. Kegel exercises will help restore bladder control. SWELLING  Try to keep your legs elevated when you are sitting. When lying down, keep your legs elevated. When wearing stockings or socks, make sure they are not too tight. WHEN TO CALL THE DOCTOR  If you have a temperature of 100.6 or more. If your bleeding has increased and you are saturating a pad or more in 1 hour for 2 hours. Your abdomen is tender to the touch. You are passing blood clots bigger than the size of a lemon for several occasions. If you are experiencing extreme weakness or dizziness. If you are having flu-like symptoms: achy muscles or joints. If there is a foul-smell or green color to your vaginal bleeding. If you have pain that cannot be relieved. You have persistent burning with urination or frequency. Call if you have concerns about your well-being. You are unable to sleep, eat or are having thoughts of harming yourself or your baby. You have swelling, bleeding, drainage, foul odor, redness or warmth in/around your incision or stitches. You have a red, warm or tender area in your calf. Headache that rest and Tylenol does not relieve.

## 2023-01-21 NOTE — FLOWSHEET NOTE
ID bands checked. Infant's ID band and Mother's matching ID bands removed and taped to footprint sheet, the mother verified as correct and witnessed by RN. Umbilical clamp and security puck removed. Discharge teaching complete, discharge instructions signed, & parent/guardian denies questions regarding infant care at time of discharge. Parents verbalized understanding to follow-up with the pediatrician as recommended on the discharge instructions. Infant placed in car seat by parent/guardian. Discharged in stable condition in car seat carried by RN to car.